# Patient Record
Sex: FEMALE | Race: WHITE
[De-identification: names, ages, dates, MRNs, and addresses within clinical notes are randomized per-mention and may not be internally consistent; named-entity substitution may affect disease eponyms.]

---

## 2019-10-25 ENCOUNTER — HOSPITAL ENCOUNTER (INPATIENT)
Dept: HOSPITAL 92 - ERS | Age: 44
Discharge: ANOTHER HOSPITAL | DRG: 305 | End: 2019-10-25
Attending: INTERNAL MEDICINE | Admitting: INTERNAL MEDICINE
Payer: MEDICARE

## 2019-10-25 VITALS — DIASTOLIC BLOOD PRESSURE: 98 MMHG | SYSTOLIC BLOOD PRESSURE: 159 MMHG

## 2019-10-25 VITALS — TEMPERATURE: 98 F

## 2019-10-25 VITALS — BODY MASS INDEX: 24.7 KG/M2

## 2019-10-25 DIAGNOSIS — Z98.51: ICD-10-CM

## 2019-10-25 DIAGNOSIS — N28.9: ICD-10-CM

## 2019-10-25 DIAGNOSIS — Z91.14: ICD-10-CM

## 2019-10-25 DIAGNOSIS — F17.210: ICD-10-CM

## 2019-10-25 DIAGNOSIS — I74.19: ICD-10-CM

## 2019-10-25 DIAGNOSIS — Z79.899: ICD-10-CM

## 2019-10-25 DIAGNOSIS — I16.1: Primary | ICD-10-CM

## 2019-10-25 DIAGNOSIS — I24.8: ICD-10-CM

## 2019-10-25 DIAGNOSIS — Z90.49: ICD-10-CM

## 2019-10-25 DIAGNOSIS — I10: ICD-10-CM

## 2019-10-25 DIAGNOSIS — Z88.8: ICD-10-CM

## 2019-10-25 LAB
ALBUMIN SERPL BCG-MCNC: 4.2 G/DL (ref 3.5–5)
ALP SERPL-CCNC: 99 U/L (ref 40–110)
ALT SERPL W P-5'-P-CCNC: 12 U/L (ref 8–55)
ANION GAP SERPL CALC-SCNC: 16 MMOL/L (ref 10–20)
AST SERPL-CCNC: 21 U/L (ref 5–34)
BASOPHILS # BLD AUTO: 0.1 THOU/UL (ref 0–0.2)
BASOPHILS NFR BLD AUTO: 0.9 % (ref 0–1)
BILIRUB SERPL-MCNC: 0.5 MG/DL (ref 0.2–1.2)
BUN SERPL-MCNC: 18 MG/DL (ref 7–18.7)
CALCIUM SERPL-MCNC: 9.9 MG/DL (ref 7.8–10.44)
CHLORIDE SERPL-SCNC: 101 MMOL/L (ref 98–107)
CK MB SERPL-MCNC: 5.3 NG/ML (ref 0–6.6)
CK SERPL-CCNC: 55 U/L (ref 29–168)
CO2 SERPL-SCNC: 25 MMOL/L (ref 22–29)
CREAT CL PREDICTED SERPL C-G-VRATE: 0 ML/MIN (ref 70–130)
DRUG SCREEN CUTOFF: (no result)
EOSINOPHIL # BLD AUTO: 0.1 THOU/UL (ref 0–0.7)
EOSINOPHIL NFR BLD AUTO: 1.9 % (ref 0–10)
GLOBULIN SER CALC-MCNC: 3 G/DL (ref 2.4–3.5)
GLUCOSE SERPL-MCNC: 115 MG/DL (ref 70–105)
HGB BLD-MCNC: 14.9 G/DL (ref 12–16)
LIPASE SERPL-CCNC: 56 U/L (ref 8–78)
LYMPHOCYTES # BLD: 1.6 THOU/UL (ref 1.2–3.4)
LYMPHOCYTES NFR BLD AUTO: 20.4 % (ref 21–51)
MACROCYTES BLD QL SMEAR: (no result) (100X)
MCH RBC QN AUTO: 35.5 PG (ref 27–31)
MCV RBC AUTO: 105 FL (ref 78–98)
MDIFF COMPLETE?: YES
MEDTOX CONTROL LINE VALID?: (no result)
MEDTOX READER #: (no result)
MONOCYTES # BLD AUTO: 0.4 THOU/UL (ref 0.11–0.59)
MONOCYTES NFR BLD AUTO: 4.5 % (ref 0–10)
NEUTROPHILS # BLD AUTO: 5.7 THOU/UL (ref 1.4–6.5)
NEUTROPHILS NFR BLD AUTO: 72.4 % (ref 42–75)
PLATELET # BLD AUTO: 180 THOU/UL (ref 130–400)
POTASSIUM SERPL-SCNC: 3.9 MMOL/L (ref 3.5–5.1)
RBC # BLD AUTO: 4.19 MILL/UL (ref 4.2–5.4)
RBC UR QL AUTO: (no result) HPF (ref 0–3)
SODIUM SERPL-SCNC: 138 MMOL/L (ref 136–145)
TROPONIN I SERPL DL<=0.01 NG/ML-MCNC: 0.64 NG/ML (ref ?–0.03)
TROPONIN I SERPL DL<=0.01 NG/ML-MCNC: 2.16 NG/ML (ref ?–0.03)
WBC # BLD AUTO: 7.9 THOU/UL (ref 4.8–10.8)
WBC UR QL AUTO: (no result) HPF (ref 0–3)

## 2019-10-25 PROCEDURE — 71045 X-RAY EXAM CHEST 1 VIEW: CPT

## 2019-10-25 PROCEDURE — 71275 CT ANGIOGRAPHY CHEST: CPT

## 2019-10-25 PROCEDURE — 96366 THER/PROPH/DIAG IV INF ADDON: CPT

## 2019-10-25 PROCEDURE — 82553 CREATINE MB FRACTION: CPT

## 2019-10-25 PROCEDURE — 96375 TX/PRO/DX INJ NEW DRUG ADDON: CPT

## 2019-10-25 PROCEDURE — 83690 ASSAY OF LIPASE: CPT

## 2019-10-25 PROCEDURE — 80306 DRUG TEST PRSMV INSTRMNT: CPT

## 2019-10-25 PROCEDURE — 93005 ELECTROCARDIOGRAM TRACING: CPT

## 2019-10-25 PROCEDURE — 82550 ASSAY OF CK (CPK): CPT

## 2019-10-25 PROCEDURE — 80053 COMPREHEN METABOLIC PANEL: CPT

## 2019-10-25 PROCEDURE — 96365 THER/PROPH/DIAG IV INF INIT: CPT

## 2019-10-25 PROCEDURE — 84484 ASSAY OF TROPONIN QUANT: CPT

## 2019-10-25 PROCEDURE — 72191 CT ANGIOGRAPH PELV W/O&W/DYE: CPT

## 2019-10-25 PROCEDURE — 74175 CTA ABDOMEN W/CONTRAST: CPT

## 2019-10-25 PROCEDURE — 85025 COMPLETE CBC W/AUTO DIFF WBC: CPT

## 2019-10-25 PROCEDURE — 81001 URINALYSIS AUTO W/SCOPE: CPT

## 2019-10-25 PROCEDURE — 83880 ASSAY OF NATRIURETIC PEPTIDE: CPT

## 2019-10-25 PROCEDURE — 36415 COLL VENOUS BLD VENIPUNCTURE: CPT

## 2019-10-25 NOTE — HP
PRIMARY CARE PHYSICIAN:  None.



CHIEF COMPLAINT:  Severe chest pain.



HISTORY OF PRESENT ILLNESS:  A 44-year-old female with known history of aortic

dissection status post repair and hypertension as well as tobacco abuse disorder,

presents to the ER with acute onset of severe chest pain associated with shortness

of breath.  The patient felt like she was having a heart attack and pain was rated

9/10.  There was no associated radiation or numbness.  The patient, however,

admitted to some nausea and vomiting in the last 2 weeks, which has subsided.

Initial blood pressure on presentation was 264/174.  The patient admitted with

noncompliance and said she has not taken her usual medications in the last several

days, though she took a tablet of clonidine last night prior to presentation to the

ER.  She was treated with her usual medications of lisinopril and

hydrochlorothiazide with no significant improvement, hence she was started on

Cardene drip.  She also received morphine and Zofran with improvement in chest pain.

 Chest pain has resolved and is currently 0/10.  With Cardene drip, blood pressure

has improved with last measurement being 169/90. 



PAST MEDICAL HISTORY:  

1. Hypertension.

2. Aortic aneurysm with dissection status post repair.

3. Scleroderma.

4. Tobacco abuse disorder.



PAST SURGICAL HISTORY:  

1. Aortic aneurysm repair.

2. Cholecystectomy.

3. Tubal ligation.



FAMILY HISTORY:  Significant for aortic aneurysm in father who  at age 39.

Mother had scleroderma as well as brain aneurysm. 



SOCIAL HISTORY:  The patient smokes cigarettes daily.  She has smoked for about 20

years.  Smokes about half pack per day.  Denied alcohol or recreational drug use.

She lives with a roommate who is the surrogate decision maker. 



ALLERGIES:  

1. CORTISONE.

2. __________.



CURRENT HOME MEDICATIONS:  

1. Lisinopril 20 mg p.o. daily.

2. Clonidine 0.3 mg daily.

3. Hydrochlorothiazide 25 mg p.o. daily.

4. Carvedilol 25 mg p.o. b.i.d.

5. Nifedipine 90 mg daily. 

Of note, the patient has not been compliant on medications.



REVIEW OF SYSTEMS:  A 12-point review of system performed was negative other than

pertinent positives and negatives included in the history of present illness. 



PHYSICAL EXAMINATION:

VITAL SIGNS:  Current vitals showed /90, pulse 99, respiratory rate 25, SpO2

of 95% on room air.  Initial vitals on presentation to the ER showed /174,

pulse 96, respiratory rate 18, temperature 97.9, SpO2 of 97 on room air. 

GENERAL:  Young female, in no obvious distress.  Afebrile.  Anicteric.  Acyanotic. 

HEENT:  Normocephalic, atraumatic.  Oral mucosa is moist. 

NECK:  Supple.  Nontender with good range of motion.  No JVD appreciated. 

CARDIOVASCULAR:  Regular rhythm and rate with normal heart sounds 1 and 2.  Soft

systolic murmur noted. 

RESPIRATORY:  Fair air entry bilaterally with few transmitted breath sounds.  No

obvious rhonchi or use of accessory muscles appreciated. 

GI:  Full, soft, nontender, nondistended with normal bowel sounds. 

EXTREMITIES:  Grossly normal looking atraumatic with no edema or erythema.  Distal

pulses are palpable. 

CNS:  Conscious, alert, oriented x3 with appropriate mental status.  Cranial nerves

2 through 12 are grossly intact.  The patient moves all extremities. 



DIAGNOSTIC DATA:  CBC showed WBC count of 7.9, hemoglobin of 14.9, MCV of 105,

platelet of 180. 



CMP showed sodium 138, potassium 3.9, chloride 101, CO2 of 25, BUN 18, creatinine

1.25, glucose 115, calcium 9.9, total bilirubin 0.5, AST 21, ALT 12, alkaline

phosphatase 99, total protein 7.2, albumin 4.2, globulin 3.0. 



Lipase is 56. 



Cardiac markers showed CK 55, CK-MB 5.3, initial troponin 0.196 and BNP 2278.

Repeat troponin however was elevated at 0.640. 



Chest x-ray showed cardiomyopathy without evidence of acute cardiopulmonary disease. 



CT angio of the chest and abdomen with IV contrast and 3D reconstruction showed

[place] B aortic dissection with associated fusiform aneurysmal dilatation of the

descending thoracic aorta with similar dimensions compared to prior study with

largest site of dilatation of the ascending aorta near the level of origin of aortic

dissection which measures 1.9.  Postsurgical changes of aortic arch with prominent

irregularity also was noted. 



EKG showed normal sinus rhythm with LVH, but no obvious ST elevation.



ASSESSMENT:  

1. Malignant hypertension/hypertensive emergency.

2. Aortic aneurysm with prior history of dissection status post repair.

3. Noncompliance with medications.

4. Elevated troponin:  Most likely demand ischemia of the myocardium due to

hypertensive emergency. 

5. Acute renal insufficiency.

6. Chest pain:  Most likely due to demand ischemia of the myocardium.  Resolved with

analgesic. 

7. History of scleroderma.  Renal function is stable and platelets are stable,

making scleroderma crisis unlikely. 

8. Tobacco abuse disorder.



PLAN:  

1. We will continue Cardene infusion.

2. We will restart home medications, but we will hold diuretic at this time.  We

will monitor blood pressure and wean off Cardene.  If Cardene is successfully weaned

off, we will transition patient to telemetry. 

3. We will get serial troponin.

4. We will consult Cardiology.

5. Tobacco cessation education and counseling provided.  We will also start the

patient on nicotine patch. 

6. Code status:  Full code.  Further treatment to follow depending on hospital

course.  We will also get urinalysis and urine drug screen. 







Job ID:  [id number]

## 2019-10-25 NOTE — CT
CT ANGIOGRAM CHEST AND ABDOMEN WITH IV CONTRAST AND 3D RECONSTRUCTIONS:

 

Date:  10/25/19 

 

HISTORY:  

Chest pain. History of prior aortic dissection. 

 

COMPARISON:  

10/01/16. 

 

FINDINGS:

Again noted is irregularity of the aortic arch, which is presumed to be postsurgical in origin and si
milar to prior exam. The [place] Type B aortic dissection is again present, beginning below the leve
l of the left subclavian artery and again extending inferiorly into the left common iliac artery and 
into the partially visualized left external iliac artery. Aneurysmal dilatation of the proximal desce
nding thoracic aorta is again seen, measuring 4.9 cm. The descending thoracic aorta distally measures
 4.3 cm in diameter. The infrarenal abdominal aorta again measures approximately 2.8 cm. 

 

The tortuosity and irregularity of the aortic arch is again present, which is presumed to be postsurg
ical in origin. Just inferior to the left subclavian artery, which is the origin of the aortic dissec
tion, there is an area of decreased density which has increased from prior study,  This may represent
 partial thrombosis of a portion of the dissection, but intramural hematoma could not be excluded. Th
ere is equal opacification of the false and true lumens. 

 

The dissection is again noted to extend into the left renal artery. The kidneys do appear to enhance 
symmetrically bilaterally. 

 

The heart remains enlarged. Coronary artery calcifications are seen. 

 

Scattered linear densities in the lungs bilaterally which may be related to atelectasis. Nonspecific 
minimal ground-glass densities are seen in the right lower lobe. There is no pleural effusion seen. 

 

Subcentimeter too small to characterize hypodense lesions seen in the right lobe of the thyroid gland
 with small subcentimeter hypodense lesion calcification again noted in the left lobe of the thyroid 
gland. 

 

The liver, spleen, pancreas, and bilateral adrenal glands demonstrate a normal CT appearance. There i
s a lobulated appearance of each kidney. 

 

No other interval change. 

 

IMPRESSION: 

1.  [place] Type B aortic dissection with associated fusiform aneurysmal dilatation of the descendi
ng thoracic aorta with similar dimensions compared to the prior study, with largest site of dilatatio
n of the ascending thoracic aorta near the level of the origin of the aortic dissection which measure
s 4.9 cm. 

 

2.  Postsurgical changes of the aortic arch with prominent irregularity unchanged from prior exam. In
ferior to the left subclavian artery, which is at the origin of the aortic dissection, there is an ar
ea of decreased density which has increased from prior study.  This may represent partial thrombosis 
of a portion of the dissection, but intramural hematoma could not be excluded.

 

3.  Persistent aneurysmal dilatation of the descending thoracic aorta and proximal abdominal aorta. 

 

4.  The aortic dissection does extend into the left renal artery, which is a stable finding. The diss
ection does not appear to extend into additional branch vessels of the abdominal aorta aside from ext
ending into the left common iliac artery. 

 

5.  Cardiomegaly and left ventricular hypertrophy. 

 

6.  Mild asymmetric interstitial edema with areas of atelectasis scattered throughout the lungs.

7.  Findings discussed with Dr. Butcher on 10/24/19 at 1514 hours. 

 

 

POS: [facility]

## 2019-10-25 NOTE — RAD
EXAM: Single view of the chest



HISTORY:   Chest pain



COMPARISON: 10/1/2016



FINDINGS: Single view of the chest shows an enlarged but stable cardiomediastinal silhouette.  The pa
tient is status post sternotomy.  There is no evidence of consolidation, mass, or pleural effusion.

The bones are unremarkable.



IMPRESSION: Cardiomegaly without evidence of acute cardiopulmonary disease



Reported By: Sal Reed 

Electronically Signed:  10/25/2019 7:45 AM

## 2019-10-27 NOTE — DIS
DATE OF ADMISSION:  10/25/2019



DATE OF DISCHARGE:  10/25/2019



DISCHARGE DIAGNOSES:  

1. Malignant hypertension.

2. Possible aortic aneurysm thrombosis.

3. Aortic aneurysm with prior history of dissection status post repair.

4. Noncompliance.

5. Elevated troponin.

6. Acute renal insufficiency.

7. Chest pain.

8. Tobacco abuse disorder.



HOSPITAL COURSE:  A 44-year-old female with prior history of aortic aneurysm, status

post dissection and repair, admitted with chest pain.  The patient was found to have

markedly elevated blood pressure with systolic blood pressure of 264/174 on

presentation.  The patient was started on Cardene infusion which was later weaned

off after oral antihypertensives were initiated.  Blood pressure improved, but

troponin trended up from 0.1 on admission to 2.1 several hours post admission.

Cardiothoracic Surgery consult was obtained and they were concerned that evaluation

of the heart could not be performed in this hospital.  Cardiology consult was

obtained also and following discussion with Cardiology, transferred to a tertiary

hospital where the prior dissection repair was done was recommended.  The patient

remained stable and was subsequently transferred to [facility] in [place]. 







Job ID:  [id number]

## 2020-04-17 NOTE — PDOC.EVN
Event Note





- Event Note


Event Note: 





inherited patient this morning, feeling much better and strength and dysarhtria 

improved. MRI showing left pontine acute infarct. received aspirin, may benefit 

plavix for 21 days but pending neurology evaluation. on exam, blood pressure 

grossly elevated but patient just started on antihypertensives. left sided 5/5, 

right sided 4/5, much improved compared to presentation both upper and lower 

extremities. Dysarthric. Swallow evaluation and recommended NPO, will reeval 

tomorrow. Can use pureed food only for administration of PO meds.

## 2020-04-17 NOTE — CT
PRELIMINARY REPORT/[facility]/EMERGENCY AFTER HOURS PROCEDURE: 

 

This report was discussed with CASIE HEALY MD by Anh Brown on Apr 17, 2020 02:37:00 CDT.

 

Addendum electronically signed by Anh Brown on April 17, 2020 2:37:29 AM CDT

 

CT BRAIN WO CON 

 

History: *LEVEL 2 STROKE ALERT* F45 presents to the ED with c/o right upper and lower extremity weakn
ess. Per EMS the weakness started around [id number] yesterday afternoon and became much worse at 1900.
 Per EMS the pt was found on the ground and was unable to get up due to weakness. Pt reports difficul
ty forming words. 

 

Comparison: None 

 

Findings: 

No acute intracranial hemorrhage, mass effect or midline shift identified. 

 Patchy and confluent periventricular and subcortical white matter hypodensities are nonspecific. 

There is remote infarct with focal encephalomalacia of the right cerebellum. 

 Vascular calcifications are present. 

Question mild focal loss of the gray-white interface and cortical hypodensity involving the left aniket
etal lobe superiorly and posteriorly (axial image 24).  Acute ischemia is not excluded at this level.
 

 Remainder of the gray-white interface is otherwise intact. 

 Calvarium intact. 

No fluid levels in the mastoid air cells. 

There is mucosal thickening and fluid level in the left maxillary sinus. 

Orbits are unremarkable. 

 

Impression: 

1.  No acute intracranial hemorrhage identified. 

2.  Question subtle focal loss of the gray-white interface in the left parietal cortex posteriorly an
d superiorly, as detailed above.  Acute ischemia at this location cannot be excluded.  If concern per
sists, consider MRI. 

3.  Patchy and confluent ventricular subcortical white matter hypodensities are nonspecific, with bro
ad differential including advanced chronic small vessel ischemic change for patient age, sequela of i
nfectious or inflammatory process, demyelinating disease and vasculitis.  Correlate with prior imagin
g to assess stability.  Followup per final report recommendations. 

4.  Remote right cerebellar punctate infarct with encephalomalacia. 

 5.  Air-fluid level in the left maxillary sinus.  Correlate for acute sinusitis. 

6.  Additional findings, as above.

 

 

ELECTRONICALLY SIGNED BY:

Tino Vyas DO

Apr 17, 2020 2:34:24 AM CDT

 

This report is intended for review by the ordering physician only, in accordance of law. If you recei
ve this report in error, please call [facility] at [phone].

 

 

 

 

 

FINAL REPORT BY DR. MARSH

 

EMERGENT AFTER HOURS STUDY

 

CT BRAIN NONCONTRAST:

 

DATE: 4/17/2020.

TIME: 2:17 a.m.

 

HISTORY:

A 45-year-old female with acute left upper extremity and left lower extremity weakness, and dysarthri
a.

 

FINDINGS:

No major disagreement with preliminary report by [facility].

 

IMPRESSION:

1.  No acute intracranial hemorrhage or mass effect.

 

2.  Diffuse severe bilateral cerebral white matter confluent hypodensities.  This presumably represen
ts severe chronic ischemic white matter changes due to microvascular atherosclerosis or vasculitis, m
uch greater than expected for this age group. It is less likely to represent late severe, stage multi
ple sclerosis (if there is a history of MS).  Recommend correlation with the patient's history.

 

3.  Tiny old lacunar infarction in right cerebellar hemisphere.

 

4.  Not mentioned in the preliminary report, small lacunar infarction of indeterminate age in the lef
t thalamus.

 

5.  MRI may be useful.

 

 

ADI OLIVA

 

POS: [facility]

## 2020-04-17 NOTE — PDOC.HHP
Hospitalist HPI





- History of Present Illness


Right sided weakness


History of Present Illness: 


Patient presenting with right sided facial and extremity weakness that started 

at 13:00 yesterday, found down on the ground by EMS.  Patient's ability to 

communicate still affected due to residual slurred speech.  Able to answer some 

questions and otherwise nods or points.





She states she has a roomate who can serve as emergency contact but no MPOA 

established. 





Reports the right sided weakness has slightly improved since arriving to the 

ED. Was able to write with her right hand upon registering in the ED. Though 

symptoms started at 13:00, she had worsening at 19:00 prompting her to come in.

  Unable to stand with EMS but normally walks independently. 





Denies any complaints otherwise.  Has felt well in herself in recent days. No 

history of TIA/CVA.  No similar symptoms in the past. 


ED Course: 





In the ED she had a CT head, per Dr. Martinez,CT shows no intracerebral 

hemorrhage but there is a subtle area of loss of gray-white differentiation 

which could indicate a subacute CVA.





CTA head and neck done and results pending. 





Given Aspirin in the ED.  





EKG done showed NSR with bilateral atrial enlargement.  Left ventricular 

hypertrophy type changes. 





 Labs showed unremarkable FBC.  Trop 0.040, Na+ 138, K+ 4, creat 1.18, BUN 21, 

GFR 49. 


Ca+ 10.5, LFTs normal,. Glucose 157. 











Hospitalist ROS





- Review of Systems


Constitutional: reports: chills (reports feeling cold in the ED).  denies: fever

, sweats, weakness, malaise, other


Eyes: denies: pain, vision change, conjunctivae inflammation, eyelid 

inflammation, redness, other


ENT: denies: ear pain, ear discharge, nose pain, nose discharge, nose congestion

, mouth pain, mouth swelling, throat pain, throat swelling, other


Respiratory: denies: cough, dry, shortness of breath, hemoptysis, SOB with 

excertion, pleuritic pain, sputum, wheezing, other


Cardiovascular: denies: chest pain, palpitations, orthopnea, paroxysmal noc. 

dyspnea, edema, light headedness, other


Gastrointestinal: denies: nausea, vomiting, abdominal pain, diarrhea, 

constipation, melena, hematochezia, other


Genitourinary: denies: dysuria, frequency, incontinence, hematuria, retention, 

other


Musculoskeletal: denies: neck pain, shoulder pain, arm pain, back pain, hand 

pain, leg pain, foot pain, other


Skin: denies: rash, lesions, vijaya, bruising, other


Neurological: reports: weakness (right arm and leg), change in speech (slurred)





- Medication


Medications: 


HOME MEDICATIONS: 


lisinopril 


TABLET : Strength - 20 mg : ORAL


Patient Dose: 20 mg Oral once a day. 


cloNIDine HCl 


TABLET : Strength - 0.1 mg : ORAL


Patient Dose: 0.3 mg once a day. 


hydrochlorothiazide 


TABLET : Strength - 25 mg : ORAL


Patient Dose: once a day. 


carvedilol 


TABLET : Strength - 25 mg : ORAL


Patient Dose: 1 tab(s) Oral 2 times a day (before meals). 


NIFEdipine 


TABLET, EXTENDED RELEASE : Strength - 90 mg : ORAL


Patient Dose: 90 mg Oral once a day.





ALLERGIES:  Cortisone, Latex





Hospitalist History





- Past Medical History


Pulmonary: reports: high cholesterol, hypertension


Other Medical History: 





Arotic dissection, Scleroderma, HTN and Acute bronchitis





- Past Surgical History


Past Surgical History: reports: Cholecystectomy, Tubal Ligation


Other Surgical History: 





Surgery for aortic dissection. 





- Family History


Family History: reports: no pertinent history





- Social History


Smoking Status: Former smoker


Alcohol: reports: None


Drugs: reports: none


Living Situation: Roommate





- Exam


General Appearance: NAD


Eye: PERRL, anicteric sclera


Eye - other findings: EOM intact 


ENT: normocephalic atraumatic, no oropharyngeal lesions, dry oral mucosa


Neck: supple, symmetric, no lymphadenopathy


Heart: RRR, normal peripheral pulses


Respiratory: CTAB, no wheezes, no rales, no ronchi, normal chest expansion, no 

tachypnea


Gastrointestinal: soft, non-tender, non-distended, normal bowel sounds, no 

guarding, no rigidity


Extremities: no edema


Skin: no rashes


Neurological: normal sensation to touch, speech deficit


Neurological - other findings: able to follow commands. Facial movements 

intact. No droop. 


Musculoskeletal - other findings: right arm and leg weakness, reduced power 4/5 

on exam. 


Psychiatric: normal affect, A&O x 3





Hospitalist Results





- Labs


Result Diagrams: 


 04/17/20 02:14





 04/17/20 02:14


Lab results: 


 











WBC  9.2 thou/uL (4.8-10.8)   04/17/20  02:14    


 


Hgb  15.6 g/dL (12.0-16.0)   04/17/20  02:14    


 


Hct  46.4 % (36.0-47.0)   04/17/20  02:14    


 


MCV  99.3 fL (78.0-98.0)  H  04/17/20  02:14    


 


Plt Count  236 thou/uL (130-400)   04/17/20  02:14    


 


Neutrophils %  70.5 % (42.0-75.0)   04/17/20  02:14    


 


Sodium  138 mmol/L (136-145)   04/17/20  02:14    


 


Potassium  4.0 mmol/L (3.5-5.1)   04/17/20  02:14    


 


Chloride  105 mmol/L ()   04/17/20  02:14    


 


Carbon Dioxide  20 mmol/L (22-29)  L  04/17/20  02:14    


 


BUN  21 mg/dL (7.0-18.7)  H  04/17/20  02:14    


 


Creatinine  1.18 mg/dL (0.6-1.1)  H  04/17/20  02:14    


 


Glucose  157 mg/dL ()  H  04/17/20  02:14    


 


Calcium  10.5 mg/dL (7.8-10.44)  H  04/17/20  02:14    


 


Total Bilirubin  0.5 mg/dL (0.2-1.2)   04/17/20  02:14    


 


AST  15 U/L (5-34)   04/17/20  02:14    


 


ALT  10 U/L (8-55)   04/17/20  02:14    


 


Alkaline Phosphatase  83 U/L ()   04/17/20  02:14    


 


CK-MB (CK-2)  2.3 ng/mL (0-6.6)   04/17/20  02:14    


 


Troponin I  0.040 ng/mL (< 0.028)  H  04/17/20  02:14    


 


Serum Total Protein  7.6 g/dL (6.0-8.3)   04/17/20  02:14    


 


Albumin  4.4 g/dL (3.5-5.0)   04/17/20  02:14    














- Radiology Interpretation


  ** CT scan - head


Status: other (results per Dr. Martinez in ED.)





Hospitalist H&P A/P





- Problem


(1) Right sided weakness


Code(s): R53.1 - WEAKNESS   Status: Acute   





(2) Slurred speech


Code(s): R47.81 - SLURRED SPEECH   Status: Acute   





(3) Renal insufficiency


Status: Acute   





(4) History of hypertension


Code(s): Z86.79 - PERSONAL HISTORY OF OTHER DISEASES OF THE CIRCULATORY SYSTEM 

  Status: Chronic   





(5) Hyperlipidemia


Code(s): E78.5 - HYPERLIPIDEMIA, UNSPECIFIED   Status: Chronic   





(6) History of aortic dissection


Code(s): Z86.79 - PERSONAL HISTORY OF OTHER DISEASES OF THE CIRCULATORY SYSTEM 

  Status: Chronic   





- Plan


Plan: 


Patient with improved symptoms but residual RUE/RLE weakness. 


CTA head/neck pending. 


MRI brain ordered as well as Echo and Neuro consult. 


Will be admitted to Stroke Unit. 


Lipid panel with AM labs. 


Monitor BP, allow for permissive hypertension. 


Continuous cardiac monitoring.


Monitor renal function.


Continue gentle hydration. 


Urine drug screen.


[place]. 


NPO, bedside screening dysphagia. 


Falls precaution. 


PT/OT. 


Verify home meds. 





CODE Status: FULL


No established MPOA.

## 2020-04-17 NOTE — CON
DATE OF CONSULTATION:  04/17/2020



HISTORY OF PRESENT ILLNESS:  Ms. Hartmann is a 45-year-old  female, who

presented to the emergency room yesterday, around 1300 hours with right facial 
and

upper extremity weakness.  She was found down by the EMS, she does have some

residuals, slurred speech.  The right-sided weakness has improved while she was 
in

the emergency room.  The symptoms started around 1300 hours, but the worsening 
of

symptoms was around 1900 hours, so she decided to come in.  She denies any 
nausea,

vomiting, headache, dizziness, vertigo, or numbness associated with the 
weakness.

Head CT was done in the emergency room, which did not reveal any acute 
intracranial

pathology, but there is a subtle area of loss of grey-white differentiation 
that is

suspicious for subacute CVA.  A CT of the head and neck were also done and was 
given

aspirin in the emergency room.  EKG was reviewed, which showed bilateral atrial

enlargement.  The patient denies any previous history of strokes. 



 



 Hospitalist ROS 



- Review of Systems

Constitutional: denies: fever, chills, sweats, weakness, malaise, other

Eyes: denies: pain, vision change, conjunctivae inflammation, eyelid 
inflammation, redness, other

ENT: denies: ear pain, ear discharge, nose pain, nose discharge, nose congestion
, mouth pain, mouth swelling, throat pain, throat swelling, other

Respiratory: denies: cough, dry, shortness of breath, hemoptysis, SOB with 
excertion, pleuritic pain, sputum, wheezing, other

Cardiovascular: denies: chest pain, palpitations, orthopnea, paroxysmal noc. 
dyspnea, edema, light headedness, other

Gastrointestinal: denies: nausea, vomiting, abdominal pain, diarrhea, 
constipation, melena, hematochezia, other

Genitourinary: denies: dysuria, frequency, incontinence, hematuria, retention, 
other

Musculoskeletal: denies: neck pain, shoulder pain, arm pain, back pain, hand 
pain, leg pain, foot pain, other

NEUROLOGICAL SYMPTOMS:  The patient is positive for weakness in the right upper 
and

lower extremity and slurred speech.  Denies nausea, vomiting, headache, 
dizziness,

or vertigo. 





- Medication

Medications: 

Active Medications







Generic Name Dose Route Start Last Admin



  Trade Name Freq  PRN Reason Stop Dose Admin

 

Aspirin  81 mg  04/17/20 09:00  04/17/20 10:02



  Ecotrin  PO   81 mg



  DAILY OLIVA   Administration



     



     



     



     

 

Dextrose/Sodium Chloride  1,000 mls @ 75 mls/hr  04/17/20 12:15  04/17/20 13:31



  D5 0.9% Ns  IV   1,000 mls



  [id number] OLIVA   Administration



     



     



     



     

 

Labetalol HCl  10 mg  04/17/20 13:00  04/17/20 13:33



  Normodyne  SLOW IVP   10 mg



  Q6H OLIVA   Administration



     



     



     



     









 Hospitalist History 



- Past Medical History

Pulmonary: reports: high cholesterol, hypertension

Other Medical History: 



Arotic dissection, Scleroderma, HTN and Acute bronchitis



- Past Surgical History

Past Surgical History: reports: Cholecystectomy, Tubal Ligation

Other Surgical History: 



Surgery for aortic dissection. 



- Family History

Family History: reports: no pertinent history



- Social History

Smoking Status: Former smoker

Alcohol: reports: None

Drugs: reports: none

Living Situation: Roommate



 Exam

General Appearance: awake alert

Eye: PERRL

ENT: normocephalic atraumatic

Neck: supple

Heart: RRR

Respiratory: no tachypnea

Gastrointestinal: soft

Extremities: no cyanosis

Skin: normal turgor

Neurological: cranial nerve grossly intact, normal sensation to touch

Neurological - other findings: right hemiparesis

Musculoskeletal: normal tone, no muscle wasting

Psychiatric: normal affect







 Hospitalist Results 



- Labs

Result Diagrams: 

 04/17/20 02:14



 04/17/20 02:14

Lab results: 

 







WBC  9.2 thou/uL (4.8-10.8)   04/17/20  02:14    

 

Hgb  15.6 g/dL (12.0-16.0)   04/17/20  02:14    

 

Hct  46.4 % (36.0-47.0)   04/17/20  02:14    

 

MCV  99.3 fL (78.0-98.0)  H  04/17/20  02:14    

 

Plt Count  236 thou/uL (130-400)   04/17/20  02:14    

 

Neutrophils %  70.5 % (42.0-75.0)   04/17/20  02:14    

 

Sodium  138 mmol/L (136-145)   04/17/20  02:14    

 

Potassium  4.0 mmol/L (3.5-5.1)   04/17/20  02:14    

 

Chloride  105 mmol/L ()   04/17/20  02:14    

 

Carbon Dioxide  20 mmol/L (22-29)  L  04/17/20  02:14    

 

BUN  21 mg/dL (7.0-18.7)  H  04/17/20  02:14    

 

Creatinine  1.18 mg/dL (0.6-1.1)  H  04/17/20  02:14    

 

Glucose  157 mg/dL ()  H  04/17/20  02:14    

 

Calcium  10.5 mg/dL (7.8-10.44)  H  04/17/20  02:14    

 

Total Bilirubin  0.5 mg/dL (0.2-1.2)   04/17/20  02:14    

 

AST  15 U/L (5-34)   04/17/20  02:14    

 

ALT  10 U/L (8-55)   04/17/20  02:14    

 

Alkaline Phosphatase  83 U/L ()   04/17/20  02:14    

 

CK-MB (CK-2)  2.3 ng/mL (0-6.6)   04/17/20  02:14    

 

Troponin I  0.040 ng/mL (< 0.028)  H  04/17/20  02:14    

 

Serum Total Protein  7.6 g/dL (6.0-8.3)   04/17/20  02:14    

 

Albumin  4.4 g/dL (3.5-5.0)   04/17/20  02:14    









 



ALLERGIES:  CORTISONE AND LATEX.

 



LABORATORY DATA:  Her labs reviewed, which shows a normal hemoglobin and 
hematocrit.

 However, she does have acute renal injury with BUN 21 and creatinine 1.15.  
Head CT

reviewed, which was unremarkable.  MRI of the brain reviewed, which showed small

acute infarct involving the left side of the tee and there is redemonstration 
of

the known middle cerebral artery cystic aneurysm. 



ASSESSMENT:  A 45-year-old female with history of hypertension, hyperlipidemia, 
and

multiple other comorbidities, consulted for dysarthria and left upper and lower

extremity weakness.  MRI consistent with acute stroke. 



PLAN:  

1. Consider MRA head without contrast and MRA of the neck without contrast to

evaluate for intracranial vessels and carotid stenosis. 

2. Allow permissive hypertension up to 220/120 after 48 to 72 hours.

3. Continue aspirin.

4. Consider adding Plavix for 21 days.  Start atorvastatin   80 p.o. at

bedtime.  Please obtain hemoglobin A1c and fasting lipid panels, transthoracic

echocardiogram. PT/OT/Speech Therapy.  DVT prophylaxis.  Continue home 
medications.

Continue medical management per primary team.  Formal speech evaluation because 
of

history of dysarthria.  We will continue to follow. 



Thank you for the consult.







Job ID:  [id number]



MTDD

## 2020-04-17 NOTE — CT
PRELIMINARY REPORT/[facility]/EMERGENCY AFTER HOURS PROCEDURE



This report was discussed with CASIE HEALY MD by Anh Brown on Apr 17, 2020 03:13:00 CDT.



Addendum electronically signed by Anh Brown on April 17, 2020 3:13:58 AM CDT



 PROCEDURE: CTA Head and Neck . 



HISTORY: Stroke alert. 



TECHNIQUE:  Computerized tomographic angiography of the head and neck was performed after the IV inje
ction of iodinated nonionic contrast.  Multiplanar and 3-D reformations with maximum intensity and

surface-shaded reformations . 



NOTE: The degree of internal carotid artery stenosis is reported using NASCET criteria. 



COMPARISONS:  None . 



FINDINGS: 



Visualized CHEST and Aorta: Moderate atherosclerosis visualized aortic arch with aneurysmal dilatatio
n of the distal large to 4.4 cm. 



NECK 



RIGHT 

   Carotid: Mild atherosclerosis proximal internal carotid artery without narrowing. 

   Vertebral: Unremarkable . 



LEFT 

   Carotid: Unremarkable . 

   Vertebral: Unremarkable . 



Non-vascular soft tissues: Unremarkable . 







HEAD 



RIGHT 

   Carotid siphon: Mild atherosclerosis. 

   Anterior cerebral: Unremarkable . 

   Middle cerebral: Possible 3 mm aneurysm distal M1 segment inferiorly. 

   Posterior cerebral: Unremarkable . 



LEFT 

   Carotid siphon: Mild atherosclerosis. 

   Anterior cerebral: Unremarkable . 

   Middle cerebral: Unremarkable . 

   Posterior cerebral: Unremarkable . 





Vertebral and Basilar arteries: Unremarkable . 



Aneurysms and AVMs: Possible distal RIGHT M1 segment region millimeter aneurysm. 



Dural Sinuses: Unremarkable . 



Non-vascular brain: No abnormal enhancement . 



IMPRESSION: 



No significant narrowing or occlusion involving major arteries of the head or neck. 



Possible 3 mm RIGHT middle cerebral artery aneurysm and conventional angiography may be helpful for h
igh-resolution evaluation.



 

ELECTRONICALLY SIGNED BY:

Reed Sousa MD

Apr 17, 2020 3:10:39 AM CDT



This report is intended for review by the ordering physician only, in accordance of law. If you recei
ve this report in error, please call [facility] at [phone].



 



FINAL REPORT 





EXAM:  CT ANGIOGRAM OF THE HEAD AND NECK



INDICATION: Stroke



COMPARISON: None



TECHNIQUE: CT angiogram of the head and neck are performed in the axial plane. Three-dimensional refo
rmatted images are submitted for interpretation.





FINDINGS:



CTA OF THE HEAD WITH AND WITHOUT CONTRAST:



POSTCONTRAST CT OF BRAIN:

Pathologic enhancement: No pathologic enhancement the brain.



Postcontrast soft tissue neck CT:

Sinuses: Left maxillary sinus mucosal disease.

Orbits: Bilateral ocular lenses are appropriately located. Both globes are intact. Retrobulbar fat is
 preserved. Symmetric attenuation the optic nerves and ocular rectus muscles.

Salivary glands:Symmetric attenuation 

Thyroid gland: Subcentimeter hypodensities in both lobes, incompletely evaluated.

Lymph nodes: No evidence of lymphadenopathy by size criteria.

Paraspinal muscles: Symmetric attenuation of the sternocleidomastoid muscles. Appropriate attenuation
 of the paraspinal muscles.

Cervical spine:Vertebral body height is maintained. No fracture. No significant central canal stenosi
s or significant neural foraminal narrowing. Limited evaluation by technique. 

Upper mediastinum and lung apices: Chronic lung parenchymal changes.



CTA OF THE NECK WITH CONTRAST:

Aorta: Redemonstration of an aortic arch and descending thoracic aorta aneurysm, incompletely evaluat
ed

Right carotid artery: Appropriate enhancement and luminal diameter. No significant stenosis based upo
n NASCET criteria

Left carotid: Appropriate enhancement and luminal diameter. No significant stenosis based upon NASCET
 criteria

Subclavian arteries:No evidence of high-grade stenosis 

Vertebral arteries:Patent throughout their course the neck. Left vertebral artery is dominant. 



CTA OF THE BRAIN:

Intracranial internal carotid arteries:Symmetric enhancement and luminal diameter. Atherosclerosis in
 both cavernous segments

Anterior circulation: Symmetric enhancement and luminal diameter. No significant stenosis. Inferior o
riented aneurysm originating from the distal right M1 segment, measuring 0.4 cm in a craniocaudal

dimension

Intracranial vertebral arteries: Short segment moderate to severe stenosis involving the distal left 
vertebral artery. Both PICA artery origins have appropriate enhancement and luminal diameter

Posterior circulation: Irregularity involving the basilar artery with areas of short segment mild aleksander
rowing. Bilateral P1 segments have appropriate enhancement and luminal diameter



IMPRESSION:

1. No hemodynamically significant stenosis of the cervical carotid arteries, based upon NASCET criter
ia.

2. Atherosclerosis involving the basilar artery and distal left vertebral artery, not mentioned in th
e initial report by [facility].

3. Inferiorly oriented aneurysm emanating from the distal right M1 segment.

4. 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 Please refer to CT from 10/25/2019 for further det
ail.





Transcribed Date/Time: 4/17/2020 8:09 AM



Reported By: Bianca Conklin 

Electronically Signed:  4/17/2020 9:04 AM

## 2020-04-17 NOTE — MRI
MRI brain noncontrast



HISTORY: CVA.



FINDINGS: Centered within the left side of the tee is an ill-defined oval focus of restricted diffus
ion measuring up to 0.8 cm x 0.5 cm greatest diameters. Correlating defect on the ADC mapping

images.



No other areas of acute infarct are apparent.



Chronic ischemic small vessel disease with gliosis evident throughout the periventricular white matte
r of each cerebral hemisphere.

Projecting inferiorly from the right middle cerebral artery, a small oval flow void correlates with t
he aneurysm detailed on recent CT arteriogram.



There innumerable tiny foci of blooming artifact on the gradient echo images throughout each cerebral
 and cerebellar hemisphere and to the deep white matter. Appearance of extensive, widespread tiny

cavernomas.



Ventricles appear normal in size, shape and position.





  



IMPRESSION :

Small acute infarct involving the left side of the tee.



Redemonstration of the known right middle cerebral artery cystic aneurysm.



Reported By: NICOLÁS Macedo 

Electronically Signed:  4/17/2020 10:33 AM

## 2020-04-18 NOTE — PDOC.HOSPP
- Subjective


Encounter Date: 04/18/20


Subjective: 


Persistent R sided weakness.





- Objective


Vital Signs & Weight: 


 Vital Signs (12 hours)











  Temp Pulse Pulse Pulse Resp BP BP


 


 04/18/20 13:48   68     


 


 04/18/20 12:00  97.6 F  68    12  


 


 04/18/20 11:38    68  58 L    161/106 H


 


 04/18/20 09:00    67  87    155/93 H


 


 04/18/20 08:49   67     


 


 04/18/20 08:42   67     


 


 04/18/20 08:40   67     


 


 04/18/20 07:45  98.0 F  67    14  


 


 04/18/20 05:21   57 L     168/87 H 


 


 04/18/20 04:00  97.7 F  60    16  


 


 04/18/20 02:46       














  BP BP Pulse Ox


 


 04/18/20 13:48   


 


 04/18/20 12:00   162/98 H  97


 


 04/18/20 11:38  162/98 H  


 


 04/18/20 09:00  156/118 H  


 


 04/18/20 08:49   


 


 04/18/20 08:42    98


 


 04/18/20 08:40   155/93 H 


 


 04/18/20 07:45   184/102 H  98


 


 04/18/20 05:21   


 


 04/18/20 04:00   179/99 H  96


 


 04/18/20 02:46   172/91 H 








 Weight











Weight                         150 lb 9.6 oz














I&O: 


 











 04/17/20 04/18/20 04/19/20





 06:59 06:59 06:59


 


Output Total   500


 


Balance   -500











Result Diagrams: 


 04/17/20 02:14





 04/18/20 06:49





Hospitalist ROS





- Medication


Medications: 


Active Medications











Generic Name Dose Route Start Last Admin





  Trade Name Freq  PRN Reason Stop Dose Admin


 


Acetaminophen  650 mg  04/17/20 03:24  04/17/20 20:46





  Tylenol  PO   650 mg





  Q4H PRN   Administration





  Headache/Fever/Mild Pain (1-3)   





     





     





     


 


Aspirin  81 mg  04/17/20 09:00  04/18/20 08:42





  Ecotrin  PO   81 mg





  DAILY OLIVA   Administration





     





     





     





     


 


Atorvastatin Calcium  40 mg  04/17/20 21:00  04/17/20 20:45





  Lipitor  PO   40 mg





  HS OLIVA   Administration





     





     





     





     


 


Carvedilol  25 mg  04/18/20 09:00  04/18/20 08:42





  Coreg  PO   25 mg





  DAILY OLIVA   Administration





     





     





     





     


 


Clopidogrel Bisulfate  75 mg  04/18/20 09:00  04/18/20 08:42





  Plavix  PO   75 mg





  DAILY OLIVA   Administration





     





     





     





     


 


Lisinopril/HCTZ  1 tab  04/18/20 09:00  04/18/20 08:42





  Prinizide 20-12.5  PO   1 tab





  DAILY OLIVA   Administration





     





     





     





     


 


Dextrose/Sodium Chloride  1,000 mls @ 75 mls/hr  04/17/20 12:15  04/18/20 05:07





  D5 0.9% Ns  IV   1,000 mls





  [id number] OLIVA   Administration





     





     





     





     


 


Labetalol HCl  10 mg  04/18/20 01:00  04/18/20 13:48





  Normodyne  SLOW IVP   Not Given





  Q4HR OLIVA   





     





     





     





     


 


Nifedipine  90 mg  04/17/20 21:00  04/17/20 20:45





  Procardia Xl  PO   90 mg





  HS OLIVA   Administration





     





     





     





     


 


Sodium Chloride  10 ml  04/17/20 03:24  04/17/20 20:54





  Flush - Normal Saline  IVF   10 ml





  Q12HR PRN   Administration





  Saline Flush   





     





     





     














- Exam


General Appearance: awake alert


ENT: normocephalic atraumatic


Neck: supple


Heart: RRR


Respiratory: no tachypnea


Gastrointestinal: soft


Neurological - other findings: R upper and lower extremity 3-4/5 weakness with 

R facial droop





Hosp A/P


(1) Cerebrovascular accident (CVA) of pontine structure


Code(s): I63.50 - CEREB INFRC DUE TO UNSP OCCLS OR STENOS OF UNSP CEREB ARTERY 

  Status: Acute   





(2) Renal insufficiency


Status: Acute   





(3) History of hypertension


Code(s): Z86.79 - PERSONAL HISTORY OF OTHER DISEASES OF THE CIRCULATORY SYSTEM 

  Status: Chronic   





(4) Hyperlipidemia


Code(s): E78.5 - HYPERLIPIDEMIA, UNSPECIFIED   Status: Chronic   





- Plan


Placed in neuro telemetry.


Neurochecks every 4 hours.


High-dose atorvastatin and plavix


Liberate blood pressure with a goal of systolic blood pressure less than 220 

and diastolic blood pressure less than 110.


Keep blood sugar level less than 180.


CTA head and neck showing no vascular occlusions.


No intracardiac thrombi on Echo


Acute pontine CVA on MRI of the brain


PT/OT/ST


Pending placement.

## 2020-04-19 NOTE — DIS
DATE OF ADMISSION:  04/17/2020



DATE OF DISCHARGE:  04/19/2020



DISCHARGE DIAGNOSES:  

1. CVA of the tee.

2. Renal insufficiency.

3. Hypertension.

4. Hyperlipidemia.



DISCHARGE MEDICATIONS:  

1. Aspirin 81 mg orally daily.

2. Atorvastatin 40 mg orally nightly.

3. Clopidogrel 75 mg orally daily for 19 days.

4. Carvedilol 25 mg orally daily.

5. Nifedipine 90 mg orally extended release at night.

6. Lisinopril/hydrochlorothiazide 20/12.5 mg tablets once daily.



HISTORY OF PRESENT ILLNESS AND HOSPITAL COURSE:  The patient is a 45-year-old female

with past medical history of hypertension, who presented to the hospital with

right-sided facial droop and right-sided body weakness that started one day prior to

presentation.  The patient was still able to communicate, but her speech was

slurred.  Her symptoms slightly improved since arrival to the ER.  In the ED, a CT

scan of the head was performed, which did not show any intracerebral hemorrhage.

There was an area of gray white differentiation which could be suggestive of an

infarct.  CTA of the head and neck did not show any acute vascular abnormalities.

Subsequent MRI of the 

brain showed small acute infarct involving the left side of the tee and right

middle cerebral artery cystic aneurysm.  Echocardiogram did not show any evidence of

intracardiac thrombi and EF was noted to be __________ The patient was started on

aspirin 81 mg daily and Plavix for 21 days per Neurology.  High-intensity statins

were also started.  Blood pressure was liberated for the first 24 hours and then

gradually brought down with IV and then oral medications.  The patient's weakness

improved slightly during the duration of her hospital stay.  We will continue the

rest of her management in inpatient rehab. 





Job ID:  [id number]

## 2020-04-19 NOTE — PDOC.HOSPP
- Subjective


Encounter Date: 04/19/20


Subjective: 


Weakness is improving





- Objective


Vital Signs & Weight: 


 Vital Signs (12 hours)











  Temp Pulse Resp BP BP Pulse Ox


 


 04/19/20 09:56   71    


 


 04/19/20 08:35   71    


 


 04/19/20 08:00  98.3 F  71  15   115/96 H  99


 


 04/19/20 05:54   67   149/92 H  


 


 04/19/20 03:38  98.1 F  67  16   149/92 H  96


 


 04/18/20 23:59  98.4 F  74  16  115/71  115/71  97








 Weight











Weight                         150 lb 9.6 oz














I&O: 


 











 04/18/20 04/19/20 04/20/20





 06:59 06:59 06:59


 


Intake Total  800 


 


Output Total  500 


 


Balance  300 











Result Diagrams: 


 04/17/20 02:14





 04/18/20 06:49





Hospitalist ROS





- Medication


Medications: 


Active Medications











Generic Name Dose Route Start Last Admin





  Trade Name Freq  PRN Reason Stop Dose Admin


 


Acetaminophen  650 mg  04/17/20 03:24  04/18/20 22:30





  Tylenol  PO   650 mg





  Q4H PRN   Administration





  Headache/Fever/Mild Pain (1-3)   





     





     





     


 


Aspirin  81 mg  04/17/20 09:00  04/19/20 08:43





  Ecotrin  PO   81 mg





  DAILY OLIVA   Administration





     





     





     





     


 


Atorvastatin Calcium  40 mg  04/17/20 21:00  04/18/20 22:30





  Lipitor  PO   40 mg





  HS OLIVA   Administration





     





     





     





     


 


Carvedilol  25 mg  04/18/20 09:00  04/19/20 08:43





  Coreg  PO   25 mg





  DAILY OLIVA   Administration





     





     





     





     


 


Clopidogrel Bisulfate  75 mg  04/18/20 09:00  04/19/20 08:43





  Plavix  PO   75 mg





  DAILY OLIVA   Administration





     





     





     





     


 


Lisinopril/HCTZ  1 tab  04/18/20 09:00  04/19/20 09:56





  Prinizide 20-12.5  PO   Not Given





  DAILY OLIVA   





     





     





     





     


 


Hydralazine HCl  20 mg  04/17/20 22:24  04/18/20 17:23





  Apresoline  SLOW IVP   20 mg





  Q6H PRN   Administration





  BP > 180/110   





     





     





     


 


Dextrose/Sodium Chloride  1,000 mls @ 75 mls/hr  04/17/20 12:15  04/18/20 21:02





  D5 0.9% Ns  IV   1,000 mls





  .[id number] OLIVA   Administration





     





     





     





     


 


Labetalol HCl  10 mg  04/18/20 01:00  04/19/20 08:35





  Normodyne  SLOW IVP   Not Given





  Q4HR OLIVA   





     





     





     





     


 


Nifedipine  90 mg  04/17/20 21:00  04/18/20 22:30





  Procardia Xl  PO   90 mg





  HS OLIVA   Administration





     





     





     





     


 


Sodium Chloride  10 ml  04/17/20 03:24  04/18/20 22:31





  Flush - Normal Saline  IVF   10 ml





  Q12HR PRN   Administration





  Saline Flush   





     





     





     














- Exam


General Appearance: awake alert


ENT: normocephalic atraumatic


Neck: supple, no JVD


Heart: RRR


Gastrointestinal: soft, non-distended


Neurological - other findings: 4/5 R U&L ext weakness. R facial droop improved.





Hosp A/P


(1) Cerebrovascular accident (CVA) of pontine structure


Code(s): I63.50 - CEREB INFRC DUE TO UNSP OCCLS OR STENOS OF UNSP CEREB ARTERY 

  Status: Acute   





(2) Renal insufficiency


Status: Acute   





(3) History of hypertension


Code(s): Z86.79 - PERSONAL HISTORY OF OTHER DISEASES OF THE CIRCULATORY SYSTEM 

  Status: Chronic   





(4) Hyperlipidemia


Code(s): E78.5 - HYPERLIPIDEMIA, UNSPECIFIED   Status: Chronic   





- Plan


Placed in neuro telemetry.


Neurochecks every 4 hours.


High-dose atorvastatin and plavix


Liberate blood pressure with a goal of systolic blood pressure less than 220 

and diastolic blood pressure less than 110.


Keep blood sugar level less than 180.


CTA head and neck showing no vascular occlusions.


No intracardiac thrombi on Echo


Acute pontine CVA on MRI of the brain


PT/OT/ST


Pending placement.

## 2020-04-21 NOTE — PQF
FÉLIX LANDAVERDE MOEZ

[id number]                                                             [address] [place] [address]

[id number]                             

                                   

CLINICAL DOCUMENTATION CLARIFICATION FORM:  POST DISCHARGE



Addendum to original discharge summary date:  __________________________________
____



Late entry note date:  _________________________________________________________
__











DATE:4/21/2020                                   ATTN: Nanda Sands



Please exercise your independent, professional judgment in responding to the 
clarification form. 

Clinical indicators are provided on the bottom of this form for your review



Please check appropriate box(s):

[  ] Acute Renal Failure/SANIYA with Acute Tubular Necrosis (ATN) 

[  ] Acute Renal Failure/SANIYA without Acute Tubular Necrosis (ATN) 

[  ] Acute Cortical Necrosis 

[  ] Acute Medullary Necrosis

[  >] Acute on Chronic Renal Failure 

      please specify Stage of CKD ________ (see below)

[  ] Other diagnosis ___________

[  ] Unable to determine



In addition, please specify:

Present on Admission (POA):  [>  ] Yes             [  ] No             [  ] 
Unable to determine



[employer] Guidelines for CKD Staging

Stage I Kidney damage with normal or increased GFRGFR > 90

Stage IIKidney damage with mildly decreased GFRGFR 60-89

Stage III Kidney damage with moderately decreased GFRGFR 30-59

Stage IVKidney damage with severely decreased GFRGFR 16-29

Stage VKidney failureGFR<15

ESRDEnd Stage Renal DiseaseOn dialysis

__________________________________________________________________

Acute Renal Failure/Acute Kidney Failure defined as:

Increases in SCr by (>) 0.3 mg/dl within 48 hours OR-

Increases in SCr by (>) 1.5 times baseline, known or presumed to have occurred 
within the prior 7 days OR-

Urine volume < 0.5 ml/kg/hour for 6 hours (KDIGO supplement 2012 for RIFLE/AKIN 
criteria)





For continuity of documentation, please document condition throughout progress 
notes and discharge summary.  Thank You.





CLINICAL INDICATORS - SIGNS / SYMPTOMS / LABS   / RESULTS AND LOCATION IN MR

Laboratory 4/17  Creatinine 1.18, BUN 21, GFR 1.18

Laboratory 4/18  Creatinine 0.89, BUN 14, GFR 68

H&P p4 4/17 Renal insufficiency

PN p1 4/19 weakness





RISK FACTORS   / RESULTS AND LOCATION IN MR

H&P p2 4/17 - HTN

H&P p2 4/17  High Cholesterol

H&P p3 4/17  Former Smoker

H&P p5 4/17 HLD

Discharge summary  - CVA of Kip

Collected 04/17  Use of contrast agent 





TREATMENTS   / RESULTS AND LOCATION IN MR

MAR 4/17  IV D5 0.9% NS 1L

MAR 4/17  IVF NS 1L

H&P p5 4/17  Monitor renal function

H&P p5 4/17  for washburn

H&P p5 4/17  continue gentle hydration









(This form is maintained as a part of the permanent medical record)

2015 [employer]  All Rights Reserved

[email or web address]    [phone]

                                                              



 



MTDD

## 2020-04-22 NOTE — CT
"PRELIMINARY REPORT"



PRELIMINARY REPORT/[facility]/EMERGENCY AFTER HOURS PROCEDURE



This report was discussed with CASIE HEALY MD by Anh Brown on Apr 17, 2020 03:13:00 CDT.



Addendum electronically signed by Anh Brown on April 17, 2020 3:13:58 AM CDT



 PROCEDURE: CTA Head and Neck . 



HISTORY: Stroke alert. 



TECHNIQUE:  Computerized tomographic angiography of the head and neck was performed after the IV inje
ction of iodinated nonionic contrast.  Multiplanar and 3-D reformations with maximum intensity and

surface-shaded reformations . 



NOTE: The degree of internal carotid artery stenosis is reported using NASCET criteria. 



COMPARISONS:  None . 



FINDINGS: 



Visualized CHEST and Aorta: Moderate atherosclerosis visualized aortic arch with aneurysmal dilatatio
n of the distal large to 4.4 cm. 



NECK 



RIGHT 

   Carotid: Mild atherosclerosis proximal internal carotid artery without narrowing. 

   Vertebral: Unremarkable . 



LEFT 

   Carotid: Unremarkable . 

   Vertebral: Unremarkable . 



Non-vascular soft tissues: Unremarkable . 







HEAD 



RIGHT 

   Carotid siphon: Mild atherosclerosis. 

   Anterior cerebral: Unremarkable . 

   Middle cerebral: Possible 3 mm aneurysm distal M1 segment inferiorly. 

   Posterior cerebral: Unremarkable . 



LEFT 

   Carotid siphon: Mild atherosclerosis. 

   Anterior cerebral: Unremarkable . 

   Middle cerebral: Unremarkable . 

   Posterior cerebral: Unremarkable . 





Vertebral and Basilar arteries: Unremarkable . 



Aneurysms and AVMs: Possible distal RIGHT M1 segment region millimeter aneurysm. 



Dural Sinuses: Unremarkable . 



Non-vascular brain: No abnormal enhancement . 



IMPRESSION: 



No significant narrowing or occlusion involving major arteries of the head or neck. 



Possible 3 mm RIGHT middle cerebral artery aneurysm and conventional angiography may be helpful for h
igh-resolution evaluation.



 

ELECTRONICALLY SIGNED BY:

Reed Sousa MD

Apr 17, 2020 3:10:39 AM CDT



This report is intended for review by the ordering physician only, in accordance of law. If you recei
ve this report in error, please call [facility] at [phone].



 



FINAL REPORT 





EXAM:  CT ANGIOGRAM OF THE HEAD AND NECK



INDICATION: Stroke



COMPARISON: None



TECHNIQUE: CT angiogram of the head and neck are performed in the axial plane. Three-dimensional refo
rmatted images are submitted for interpretation.





FINDINGS:



CTA OF THE HEAD WITH AND WITHOUT CONTRAST:



POSTCONTRAST CT OF BRAIN:

Pathologic enhancement: No pathologic enhancement the brain.



Postcontrast soft tissue neck CT:

Sinuses: Left maxillary sinus mucosal disease.

Orbits: Bilateral ocular lenses are appropriately located. Both globes are intact. Retrobulbar fat is
 preserved. Symmetric attenuation the optic nerves and ocular rectus muscles.

Salivary glands:Symmetric attenuation 

Thyroid gland: Subcentimeter hypodensities in both lobes, incompletely evaluated.

Lymph nodes: No evidence of lymphadenopathy by size criteria.

Paraspinal muscles: Symmetric attenuation of the sternocleidomastoid muscles. Appropriate attenuation
 of the paraspinal muscles.

Cervical spine:Vertebral body height is maintained. No fracture. No significant central canal stenosi
s or significant neural foraminal narrowing. Limited evaluation by technique. 

Upper mediastinum and lung apices: Chronic lung parenchymal changes.



CTA OF THE NECK WITH CONTRAST:

Aorta: Redemonstration of an aortic arch and descending thoracic aorta aneurysm, incompletely evaluat
ed

Right carotid artery: Appropriate enhancement and luminal diameter. No significant stenosis based upo
n NASCET criteria

Left carotid: Appropriate enhancement and luminal diameter. No significant stenosis based upon NASCET
 criteria

Subclavian arteries:No evidence of high-grade stenosis 

Vertebral arteries:Patent throughout their course the neck. Left vertebral artery is dominant. 



CTA OF THE BRAIN:

Intracranial internal carotid arteries:Symmetric enhancement and luminal diameter. Atherosclerosis in
 both cavernous segments

Anterior circulation: Symmetric enhancement and luminal diameter. No significant stenosis. Inferior o
riented aneurysm originating from the distal right M1 segment, measuring 0.4 cm in a craniocaudal

dimension

Intracranial vertebral arteries: Short segment moderate to severe stenosis involving the distal left 
vertebral artery. Both PICA artery origins have appropriate enhancement and luminal diameter

Posterior circulation: Irregularity involving the basilar artery with areas of short segment mild aleksander
rowing. Bilateral P1 segments have appropriate enhancement and luminal diameter



IMPRESSION:

1. No hemodynamically significant stenosis of the cervical carotid arteries, based upon NASCET criter
ia.

2. Atherosclerosis involving the basilar artery and distal left vertebral artery, not mentioned in th
e initial report by [facility].

3. Inferiorly oriented aneurysm emanating from the distal right M1 segment.

4. 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 Please refer to CT from 10/25/2019 for further det
ail.



Transcribed Date/Time: 4/22/2020 2:02 PM

## 2020-10-02 NOTE — ULT
THYROID ULTRASOUND



INDICATION: Nodule seen on carotid Doppler evaluation



TECHNIQUE: Grayscale and color Doppler images were obtained of the thyroid gland.



COMPARISON: Carotid duplex ultrasound dated May 14, 2019



FINDINGS:



Right thyroid lobe: The right thyroid lobe measures 3.7 x 2.1 x 1.6 cm. There is a tiny subcentimeter
 cyst in the lower pole right thyroid gland.



Thyroid isthmus: The thyroid isthmus measures 0.30 cm.



Left thyroid lobe: The left thyroid lobe measures 5.2 x 1.7 x 1.2 cm. There is a TIRADS 1 and 1.2 cm 
cyst in the superior pole left thyroid gland that corresponds to the abnormality seen on the

carotid ultrasound. There is a solid isoechoic irregular mass with a central macrocalcification the l
ower pole of the left thyroid lobe lobe measuring 1.1 x 1.0 x 1.2 cm.



IMPRESSION:

1. TIRADS 4 lesion of the lower pole left thyroid gland. Recommend follow-up ultrasound in one year t
o document stability.

2. TIRADS 1 lesions the superior pole left thyroid gland and inferior pole of the right thyroid gland
.



Reported By: Abdullahi Prado 

Electronically Signed:  10/2/2020 2:18 PM

## 2020-10-10 NOTE — CT
CT Brain WO Con



History: Fall



Comparison: CT brain April 2020



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 Extensive microvascular ischemic changes of the s
ubcortical and deep white matter. No acute hemorrhage or territorial infarction.



Calvarium is intact. Paranasal sinuses and mastoids are clear.



Impression: Chronic findings. No acute intracranial abnormality.



Reported By: Dany Lane 

Electronically Signed:  10/10/2020 8:18 PM